# Patient Record
Sex: FEMALE | Race: WHITE | NOT HISPANIC OR LATINO | Employment: OTHER | URBAN - METROPOLITAN AREA
[De-identification: names, ages, dates, MRNs, and addresses within clinical notes are randomized per-mention and may not be internally consistent; named-entity substitution may affect disease eponyms.]

---

## 2024-08-26 NOTE — PROGRESS NOTES
Nursing Home History and Physical Note      Juan Antonio Schwartz DO  [x]  [address], [address]  [place] [address]  Phone: [phone]  Fax: [phone]     PATIENT NAME: Chayo Willis                                                                            YOB: 1934              DATE OF SERVICE: 08/27/2024    FACILITY:   [x] [facility]   [] Other ______________     CHIEF COMPLAINT:   Weakness, recent fall with SDH, SAH, left clavicular fx      HISTORY OF PRESENT ILLNESS:   Patient is a 89-year-old female with past medical history of diverticulosis who recently was admitted to [facility] after a fall with complications of subarachnoid hemorrhage, subdural hemorrhage, and left clavicle fracture.  Incidentally found to have UTI.  Now she is here for rehab.  Did not require any surgery for her bleeds.    PAST MEDICAL & SURGICAL HISTORY:   Past Medical History:   Diagnosis Date    Diverticulosis       No past surgical history on file.       MEDICATIONS:  I have reviewed and reconciled the patients medication list in the patients chart at the skilled nursing facility today.      ALLERGIES:  Reviewed  No Known Allergies      SOCIAL HISTORY:  Reviewed  Social History     Socioeconomic History    Marital status:    Tobacco Use    Smoking status: Never    Smokeless tobacco: Never   Substance and Sexual Activity    Alcohol use: Never    Drug use: Never       FAMILY HISTORY:  Reviewed  Family History   Problem Relation Age of Onset    No Known Problems Mother     No Known Problems Father        REVIEW OF SYSTEMS:  Gen- No fevers, chills  CV- No chest pain, palpitations  Resp- No cough, dyspnea  GI- No N/V/D, abd pain    Review of Systems      PHYSICAL EXAMINATION:   VITAL SIGNS: Temperature 98.5, pulse 81, respirations 16, blood pressure 131/59, O2 saturation 95%    Physical Exam  Constitutional:       General: She is not in acute distress.     Appearance: Normal appearance. She is not  ill-appearing.   HENT:      Nose: No congestion or rhinorrhea.      Mouth/Throat:      Mouth: Mucous membranes are moist.      Pharynx: No oropharyngeal exudate.   Eyes:      Extraocular Movements: Extraocular movements intact.      Conjunctiva/sclera: Conjunctivae normal.      Pupils: Pupils are equal, round, and reactive to light.   Cardiovascular:      Rate and Rhythm: Normal rate and regular rhythm.   Pulmonary:      Effort: Pulmonary effort is normal. No respiratory distress.      Breath sounds: Normal breath sounds.   Abdominal:      General: Abdomen is flat. Bowel sounds are normal.      Palpations: Abdomen is soft.      Tenderness: There is no abdominal tenderness.   Musculoskeletal:         General: No swelling.      Cervical back: Neck supple.      Right lower leg: No edema.      Left lower leg: No edema.   Skin:     Coloration: Skin is not jaundiced.      Findings: No rash.   Neurological:      General: No focal deficit present.      Mental Status: She is alert and oriented to person, place, and time.      Cranial Nerves: No cranial nerve deficit.   Psychiatric:         Mood and Affect: Mood normal.         Behavior: Behavior normal.         Thought Content: Thought content normal.         RECORDS REVIEW:   Hospital records reviewed    ASSESSMENT     Diagnoses and all orders for this visit:    1. Generalized weakness (Primary)  2. Fall, subsequent encounter  --PT/OT  --Start vit d    3. SAH (subarachnoid hemorrhage)  4. SDH (subdural hematoma)  --stable, required no intervention    5. Displaced fracture of lateral end of left clavicle, initial encounter for closed fracture  --nonoperative management, eval by ortho while inpatient    6. Diverticulosis    7. E. coli UTI  --s/p fosfomycin    8. Vit d def  --start vit D.    PLAN    [x]  Discussed Patient in detail with nursing/staff, addressed all needs today.     [x]  Plan of Care Reviewed   [x]  PT/OT Reviewed   []  Order Changes  [x]  Discharge Plans  Reviewed  []  Advance Directive on file with Nursing Home.   []  POA on file with Nursing Home.   [x]  Code Status listed: [x]  Full Code   []  DNR          Total face to face time spent with patient 35 minutes. Of which  35 minutes where in counseling the patient and family.     Juan Antonio Schwartz DO  [facility] [place] [facility]rera

## 2024-08-27 ENCOUNTER — NURSING HOME (OUTPATIENT)
Dept: INTERNAL MEDICINE | Facility: CLINIC | Age: 89
End: 2024-08-27
Payer: MEDICARE

## 2024-08-27 DIAGNOSIS — S42.032A DISPLACED FRACTURE OF LATERAL END OF LEFT CLAVICLE, INITIAL ENCOUNTER FOR CLOSED FRACTURE: ICD-10-CM

## 2024-08-27 DIAGNOSIS — N39.0 E. COLI UTI: ICD-10-CM

## 2024-08-27 DIAGNOSIS — B96.20 E. COLI UTI: ICD-10-CM

## 2024-08-27 DIAGNOSIS — R53.1 GENERALIZED WEAKNESS: Primary | ICD-10-CM

## 2024-08-27 DIAGNOSIS — S06.5XAA SDH (SUBDURAL HEMATOMA): ICD-10-CM

## 2024-08-27 DIAGNOSIS — K57.90 DIVERTICULOSIS: ICD-10-CM

## 2024-08-27 DIAGNOSIS — I60.9 SAH (SUBARACHNOID HEMORRHAGE): ICD-10-CM
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
## 2024-08-27 DIAGNOSIS — E55.9 VITAMIN D DEFICIENCY: ICD-10-CM

## 2024-08-27 PROCEDURE — 99305 1ST NF CARE MODERATE MDM 35: CPT | Performed by: INTERNAL MEDICINE

## 2024-08-27 NOTE — LETTER
Nursing Home History and Physical Note      Juan Antonio Schwartz DO  [x]  [address] [address], [address]  [place] [address]  Phone: [phone]  Fax: [phone]     PATIENT NAME: Chayo Willis                                                                            YOB: 1934              DATE OF SERVICE: 08/27/2024    FACILITY:   [x] [facility] [place]   [] Other ______________     CHIEF COMPLAINT:   Weakness, recent fall with SDH, SAH, left clavicular fx      HISTORY OF PRESENT ILLNESS:   Patient is a 89-year-old female with past medical history of diverticulosis who recently was admitted to [facility] after a fall with complications of subarachnoid hemorrhage, subdural hemorrhage, and left clavicle fracture.  Incidentally found to have UTI.  Now she is here for rehab.  Did not require any surgery for her bleeds.    PAST MEDICAL & SURGICAL HISTORY:   Past Medical History:   Diagnosis Date    Diverticulosis       No past surgical history on file.       MEDICATIONS:  I have reviewed and reconciled the patients medication list in the patients chart at the skilled nursing facility today.      ALLERGIES:  Reviewed  No Known Allergies      SOCIAL HISTORY:  Reviewed  Social History     Socioeconomic History    Marital status:    Tobacco Use    Smoking status: Never    Smokeless tobacco: Never   Substance and Sexual Activity    Alcohol use: Never    Drug use: Never       FAMILY HISTORY:  Reviewed  Family History   Problem Relation Age of Onset    No Known Problems Mother     No Known Problems Father        REVIEW OF SYSTEMS:  Gen- No fevers, chills  CV- No chest pain, palpitations  Resp- No cough, dyspnea  GI- No N/V/D, abd pain    Review of Systems      PHYSICAL EXAMINATION:   VITAL SIGNS: Temperature 98.5, pulse 81, respirations 16, blood pressure 131/59, O2 saturation 95%    Physical Exam  Constitutional:       General: She is not in acute distress.     Appearance: Normal appearance. She is not  ill-appearing.   HENT:      Nose: No congestion or rhinorrhea.      Mouth/Throat:      Mouth: Mucous membranes are moist.      Pharynx: No oropharyngeal exudate.   Eyes:      Extraocular Movements: Extraocular movements intact.      Conjunctiva/sclera: Conjunctivae normal.      Pupils: Pupils are equal, round, and reactive to light.   Cardiovascular:      Rate and Rhythm: Normal rate and regular rhythm.   Pulmonary:      Effort: Pulmonary effort is normal. No respiratory distress.      Breath sounds: Normal breath sounds.   Abdominal:      General: Abdomen is flat. Bowel sounds are normal.      Palpations: Abdomen is soft.      Tenderness: There is no abdominal tenderness.   Musculoskeletal:         General: No swelling.      Cervical back: Neck supple.      Right lower leg: No edema.      Left lower leg: No edema.   Skin:     Coloration: Skin is not jaundiced.      Findings: No rash.   Neurological:      General: No focal deficit present.      Mental Status: She is alert and oriented to person, place, and time.      Cranial Nerves: No cranial nerve deficit.   Psychiatric:         Mood and Affect: Mood normal.         Behavior: Behavior normal.         Thought Content: Thought content normal.         RECORDS REVIEW:   Hospital records reviewed    ASSESSMENT     Diagnoses and all orders for this visit:    1. Generalized weakness (Primary)  2. Fall, subsequent encounter  --PT/OT  --Start vit d    3. SAH (subarachnoid hemorrhage)  4. SDH (subdural hematoma)  --stable, required no intervention    5. Displaced fracture of lateral end of left clavicle, initial encounter for closed fracture  --nonoperative management, eval by ortho while inpatient    6. Diverticulosis    7. E. coli UTI  --s/p fosfomycin    8. Vit d def  --start vit D.    PLAN    [x]  Discussed Patient in detail with nursing/staff, addressed all needs today.     [x]  Plan of Care Reviewed   [x]  PT/OT Reviewed   []  Order Changes  [x]  Discharge Plans  Reviewed  []  Advance Directive on file with Nursing Home.   []  POA on file with Nursing Home.   [x]  Code Status listed: [x]  Full Code   []  DNR          Total face to face time spent with patient 35 minutes. Of which  35 minutes where in counseling the patient and family.     Juan Antonio Schwartz DO  [facility] [place] [place]